# Patient Record
Sex: FEMALE | Race: WHITE | NOT HISPANIC OR LATINO | Employment: STUDENT | URBAN - METROPOLITAN AREA
[De-identification: names, ages, dates, MRNs, and addresses within clinical notes are randomized per-mention and may not be internally consistent; named-entity substitution may affect disease eponyms.]

---
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
## 2025-07-02 ENCOUNTER — TELEPHONE (OUTPATIENT)
Dept: OBSTETRICS AND GYNECOLOGY | Facility: CLINIC | Age: 20
End: 2025-07-02

## 2025-07-02 NOTE — TELEPHONE ENCOUNTER
Copied from CRM #[id number]. Topic: Appointments - Appointment Access  >> Jul 2, 2025  8:17 AM Sugar wrote:  Type:  Schedule pregnancy confirmation Apoointment Request    Name of Caller:Celia Landers   Last menstrual cycle period: May/14/2025  She took a pregnancy test on June/30/2025 and it came positive  Would the patient rather a call back or a response via [facility]? Call back  Best Call Back Number:[phone]   Additional Information: She states she will be self pay for the first visit because she will apply for insurance this week  Thanks!  >> Jul 2, 2025  8:49 AM Med Assistant Yara wrote:    ----- Message -----  From: Sugar Jane  Sent: 7/2/2025   8:19 AM CDT  To: Lakshmi Ng (Obgyn) Staff

## 2025-07-07 ENCOUNTER — TELEPHONE (OUTPATIENT)
Dept: OBSTETRICS AND GYNECOLOGY | Facility: CLINIC | Age: 20
End: 2025-07-07

## 2025-07-21 DIAGNOSIS — Z34.91 ENCOUNTER FOR PREGNANCY RELATED EXAMINATION IN FIRST TRIMESTER: Primary | ICD-10-CM

## 2025-07-21 DIAGNOSIS — Z03.89 ENCOUNTER FOR OBSERVATION FOR OTHER SUSPECTED DISEASES AND CONDITIONS RULED OUT: ICD-10-CM
